# Patient Record
Sex: FEMALE | Race: WHITE | NOT HISPANIC OR LATINO | Employment: OTHER | URBAN - METROPOLITAN AREA
[De-identification: names, ages, dates, MRNs, and addresses within clinical notes are randomized per-mention and may not be internally consistent; named-entity substitution may affect disease eponyms.]

---

## 2023-11-08 PROBLEM — M79.7 FIBROMYALGIA: Status: ACTIVE | Noted: 2023-11-08

## 2023-11-08 PROBLEM — G89.29 CHRONIC PAIN OF BOTH KNEES: Status: ACTIVE | Noted: 2023-11-08

## 2023-11-08 PROBLEM — M25.561 CHRONIC PAIN OF BOTH KNEES: Status: ACTIVE | Noted: 2023-11-08

## 2023-11-08 PROBLEM — Q79.62 EHLERS-DANLOS, HYPERMOBILE TYPE (HHS-HCC): Status: ACTIVE | Noted: 2023-11-08

## 2023-11-08 PROBLEM — G96.198 PSEUDOMENINGOCELE: Status: ACTIVE | Noted: 2023-11-08

## 2023-11-08 PROBLEM — M25.562 CHRONIC PAIN OF BOTH KNEES: Status: ACTIVE | Noted: 2023-11-08

## 2023-11-08 PROBLEM — G56.00 CARPAL TUNNEL SYNDROME: Status: ACTIVE | Noted: 2023-11-08

## 2023-11-08 PROBLEM — Q07.00 ARNOLD-CHIARI MALFORMATION (MULTI): Status: ACTIVE | Noted: 2023-11-08

## 2023-11-08 PROBLEM — G90.A POTS (POSTURAL ORTHOSTATIC TACHYCARDIA SYNDROME): Status: ACTIVE | Noted: 2023-11-08

## 2023-11-08 PROBLEM — I35.1 AORTIC VALVE REGURGITATION, NONRHEUMATIC: Status: ACTIVE | Noted: 2023-11-08

## 2023-11-08 PROBLEM — E66.3 OVERWEIGHT (BMI 25.0-29.9): Status: ACTIVE | Noted: 2023-11-08

## 2023-11-08 PROBLEM — G24.3 CERVICAL DYSTONIA: Status: ACTIVE | Noted: 2023-11-08

## 2023-11-08 PROBLEM — G43.711 CHRONIC MIGRAINE WITHOUT AURA, WITH INTRACTABLE MIGRAINE, SO STATED, WITH STATUS MIGRAINOSUS: Status: ACTIVE | Noted: 2023-11-08

## 2023-11-08 PROBLEM — F17.200 CURRENT SMOKER: Status: ACTIVE | Noted: 2023-11-08

## 2023-11-08 RX ORDER — HYDROXYZINE HYDROCHLORIDE 25 MG/1
25 TABLET, FILM COATED ORAL NIGHTLY PRN
COMMUNITY
Start: 2021-09-28

## 2023-11-08 RX ORDER — POLYETHYLENE GLYCOL 3350 17 G/17G
17 POWDER, FOR SOLUTION ORAL
COMMUNITY

## 2023-11-08 RX ORDER — AMITRIPTYLINE HYDROCHLORIDE 25 MG/1
TABLET, FILM COATED ORAL
COMMUNITY

## 2023-11-08 RX ORDER — DOCUSATE SODIUM 100 MG/1
100 CAPSULE, LIQUID FILLED ORAL
COMMUNITY

## 2023-11-09 ENCOUNTER — HOSPITAL ENCOUNTER (OUTPATIENT)
Dept: NEUROLOGY | Facility: HOSPITAL | Age: 48
Discharge: HOME | End: 2023-11-09
Payer: COMMERCIAL

## 2023-11-09 DIAGNOSIS — G56.00 CARPAL TUNNEL SYNDROME, UNSPECIFIED UPPER LIMB: ICD-10-CM

## 2023-11-09 PROCEDURE — 95913 NRV CNDJ TEST 13/> STUDIES: CPT | Performed by: PSYCHIATRY & NEUROLOGY

## 2023-11-09 PROCEDURE — 95886 MUSC TEST DONE W/N TEST COMP: CPT | Performed by: PSYCHIATRY & NEUROLOGY

## 2023-11-09 PROCEDURE — 95886 MUSC TEST DONE W/N TEST COMP: CPT | Mod: GC | Performed by: PSYCHIATRY & NEUROLOGY

## 2023-11-09 PROCEDURE — 95913 NRV CNDJ TEST 13/> STUDIES: CPT | Mod: GC | Performed by: PSYCHIATRY & NEUROLOGY

## 2023-11-15 ENCOUNTER — OFFICE VISIT (OUTPATIENT)
Dept: ORTHOPEDIC SURGERY | Facility: CLINIC | Age: 48
End: 2023-11-15
Payer: COMMERCIAL

## 2023-11-15 ENCOUNTER — PREP FOR PROCEDURE (OUTPATIENT)
Dept: ORTHOPEDIC SURGERY | Facility: CLINIC | Age: 48
End: 2023-11-15

## 2023-11-15 VITALS — WEIGHT: 150 LBS | HEIGHT: 66 IN | BODY MASS INDEX: 24.11 KG/M2

## 2023-11-15 DIAGNOSIS — G56.01 CARPAL TUNNEL SYNDROME OF RIGHT WRIST: ICD-10-CM

## 2023-11-15 DIAGNOSIS — G56.02 CARPAL TUNNEL SYNDROME OF LEFT WRIST: Primary | ICD-10-CM

## 2023-11-15 PROCEDURE — 99204 OFFICE O/P NEW MOD 45 MIN: CPT | Performed by: ORTHOPAEDIC SURGERY

## 2023-11-15 NOTE — PROGRESS NOTES
New patient ref from Dr. Grady bilateral carpal tunnel - EMG done - right worse than left -no prior sx, patient does wear braces and has had injections in the past 2011  Patient has 2 types of marcial-Danlos

## 2023-11-15 NOTE — H&P (VIEW-ONLY)
48 y.o. female presents today for evaluation of bilateral hand numbness, tingling, weakness and pain. The patient reports symptoms for months, getting worse.  Pain is controlled.  Reports no previous surgeries or trauma to the area.  Reports pain worse with use, better at rest.  Pain numb and tingly, wakes them from sleep.   Worse driving a car and talking on a phone.  Splints have been worn without much relief.  Had shots a decade ago.  Got EMG.  Wants surgery.    Review of Systems    Constitutional: no fever, no chills, not feeling tired, no recent weight gain and no recent weight loss.   ENT: no nosebleeds.   Cardiovascular: no chest pain.   Respiratory: no shortness of breath and no cough.   Gastrointestinal: no abdominal pain, no nausea, no vomiting and no diarrhea.   Musculoskeletal: per HPI  Integumentary: no rashes and no skin wound.   Neurological: no headache.   Psychiatric: no depression and no sleep disturbances.   Endocrine: no muscle weakness and no muscle cramps.   Hematologic/Lymphatic: no swollen glands and no tendency for easy bruising.     All other systems have been reviewed and are negative for complaint    Patient's past medical history, past surgical history, allergies, and medications have been reviewed unless otherwise noted in the chart.     Carpal Tunnel Exam  Inspection:  no evidence of infection, no edema, no erythema, no ecchymosis, Palpation:  compartments are soft, no pain with palpation, Range of Motion:  full wrist and finger range of motion, Stability:  no wrist instability detected, Strength:  5/5 APB and intrinsics, Skin:  intact, Vascular:  capillary refill <2 seconds distally, Sensation:  decreased in the median nerve distribution, Test:  positive Tinel's at the Carpal Tunnel, positive Direct Carpal Tunnel Compression Test      Constitutional   General appearance: Alert and in no acute distress. Well developed, well nourished.    Eyes   External Eye, Conjunctiva and lids:  Normal external exam - pupils were equal in size, round, reactive to light (PERRL) with normal accommodation and extraocular movements intact (EOMI).   Ears, Nose, Mouth, and Throat   Hearing: Normal.   Neck   Neck: No neck mass was observed. Supple.   Pulmonary   Respiratory effort: No respiratory distress.   Auscultation of lungs: Clear bilateral breath sounds.   Cardiovascular   Examination of extremities: No peripheral edema.   Auscultation of heart: Heart rate and rhythm were normal, normal S1 and S2, no gallops, no murmurs and no pericardial rub.   Abdomen   Abdomen: Normal bowel sounds, soft nontender; no abdominal mass palpated.. No rebound, rigidity or guarding.    Skin   Skin and subcutaneous tissue: Normal skin color and pigmentation, normal skin turgor, and no rash.   Neurologic   Sensation: Normal.   Psychiatric   Judgment and insight: Intact.   Orientation to person, place, and time: Alert and oriented x 3.       Mood and affect: Normal.      EMG _ mild to mod right and mild left CTS    Bilateral CTS  Surgery discussion I discussed the diagnosis and treatment options with the patient today along with their associated risks and benefits. After thorough discussion, the patient has elected to proceed with surgical intervention. The patient understands the risks of bleeding, infection, loss of life or limb, pain, loss of motion, failure of the goals of surgery or the potential need for additional surgery. The patient would like to accept these risks and proceed. All questions were answered to the patients satisfaction and the patient seems satisfied with the plan.    Plan right ECTR  FU 2 weeks after - No XR

## 2023-11-30 ENCOUNTER — ANESTHESIA EVENT (OUTPATIENT)
Dept: OPERATING ROOM | Facility: HOSPITAL | Age: 48
End: 2023-11-30
Payer: COMMERCIAL

## 2023-12-08 ENCOUNTER — HOSPITAL ENCOUNTER (OUTPATIENT)
Facility: HOSPITAL | Age: 48
Setting detail: OUTPATIENT SURGERY
Discharge: HOME | End: 2023-12-08
Attending: ORTHOPAEDIC SURGERY | Admitting: ORTHOPAEDIC SURGERY
Payer: COMMERCIAL

## 2023-12-08 ENCOUNTER — ANESTHESIA (OUTPATIENT)
Dept: OPERATING ROOM | Facility: HOSPITAL | Age: 48
End: 2023-12-08
Payer: COMMERCIAL

## 2023-12-08 VITALS
HEART RATE: 59 BPM | WEIGHT: 150 LBS | TEMPERATURE: 97.4 F | HEIGHT: 67 IN | DIASTOLIC BLOOD PRESSURE: 77 MMHG | OXYGEN SATURATION: 100 % | SYSTOLIC BLOOD PRESSURE: 122 MMHG | RESPIRATION RATE: 17 BRPM | BODY MASS INDEX: 23.54 KG/M2

## 2023-12-08 DIAGNOSIS — G56.01 CARPAL TUNNEL SYNDROME OF RIGHT WRIST: Primary | ICD-10-CM

## 2023-12-08 LAB — PREGNANCY TEST URINE, POC: NEGATIVE

## 2023-12-08 PROCEDURE — 7100000010 HC PHASE TWO TIME - EACH INCREMENTAL 1 MINUTE: Performed by: ORTHOPAEDIC SURGERY

## 2023-12-08 PROCEDURE — 7100000009 HC PHASE TWO TIME - INITIAL BASE CHARGE: Performed by: ORTHOPAEDIC SURGERY

## 2023-12-08 PROCEDURE — 3700000002 HC GENERAL ANESTHESIA TIME - EACH INCREMENTAL 1 MINUTE: Performed by: ORTHOPAEDIC SURGERY

## 2023-12-08 PROCEDURE — 2720000007 HC OR 272 NO HCPCS: Performed by: ORTHOPAEDIC SURGERY

## 2023-12-08 PROCEDURE — 2500000005 HC RX 250 GENERAL PHARMACY W/O HCPCS: Performed by: ORTHOPAEDIC SURGERY

## 2023-12-08 PROCEDURE — 3600000008 HC OR TIME - EACH INCREMENTAL 1 MINUTE - PROCEDURE LEVEL THREE: Performed by: ORTHOPAEDIC SURGERY

## 2023-12-08 PROCEDURE — 3600000003 HC OR TIME - INITIAL BASE CHARGE - PROCEDURE LEVEL THREE: Performed by: ORTHOPAEDIC SURGERY

## 2023-12-08 PROCEDURE — 2500000004 HC RX 250 GENERAL PHARMACY W/ HCPCS (ALT 636 FOR OP/ED): Performed by: LICENSED PRACTICAL NURSE

## 2023-12-08 PROCEDURE — 81025 URINE PREGNANCY TEST: CPT | Performed by: ANESTHESIOLOGY

## 2023-12-08 PROCEDURE — 2500000004 HC RX 250 GENERAL PHARMACY W/ HCPCS (ALT 636 FOR OP/ED): Performed by: ANESTHESIOLOGY

## 2023-12-08 PROCEDURE — 3700000001 HC GENERAL ANESTHESIA TIME - INITIAL BASE CHARGE: Performed by: ORTHOPAEDIC SURGERY

## 2023-12-08 PROCEDURE — 2500000001 HC RX 250 WO HCPCS SELF ADMINISTERED DRUGS (ALT 637 FOR MEDICARE OP): Performed by: ANESTHESIOLOGY

## 2023-12-08 PROCEDURE — 7100000001 HC RECOVERY ROOM TIME - INITIAL BASE CHARGE: Performed by: ORTHOPAEDIC SURGERY

## 2023-12-08 PROCEDURE — 29848 WRIST ENDOSCOPY/SURGERY: CPT | Performed by: ORTHOPAEDIC SURGERY

## 2023-12-08 PROCEDURE — 7100000002 HC RECOVERY ROOM TIME - EACH INCREMENTAL 1 MINUTE: Performed by: ORTHOPAEDIC SURGERY

## 2023-12-08 RX ORDER — LIDOCAINE HYDROCHLORIDE 10 MG/ML
0.1 INJECTION, SOLUTION EPIDURAL; INFILTRATION; INTRACAUDAL; PERINEURAL ONCE
Status: DISCONTINUED | OUTPATIENT
Start: 2023-12-08 | End: 2023-12-08 | Stop reason: HOSPADM

## 2023-12-08 RX ORDER — BUPIVACAINE HCL/EPINEPHRINE 0.5-1:200K
VIAL (ML) INJECTION AS NEEDED
Status: DISCONTINUED | OUTPATIENT
Start: 2023-12-08 | End: 2023-12-08 | Stop reason: HOSPADM

## 2023-12-08 RX ORDER — PROPOFOL 10 MG/ML
INJECTION, EMULSION INTRAVENOUS AS NEEDED
Status: DISCONTINUED | OUTPATIENT
Start: 2023-12-08 | End: 2023-12-08

## 2023-12-08 RX ORDER — LIDOCAINE HYDROCHLORIDE AND EPINEPHRINE 20; 10 MG/ML; UG/ML
INJECTION, SOLUTION INFILTRATION; PERINEURAL AS NEEDED
Status: DISCONTINUED | OUTPATIENT
Start: 2023-12-08 | End: 2023-12-08 | Stop reason: HOSPADM

## 2023-12-08 RX ORDER — ONDANSETRON 4 MG/1
4 TABLET, FILM COATED ORAL EVERY 8 HOURS PRN
Qty: 10 TABLET | Refills: 0 | Status: SHIPPED | OUTPATIENT
Start: 2023-12-08

## 2023-12-08 RX ORDER — HYDROCODONE BITARTRATE AND ACETAMINOPHEN 5; 325 MG/1; MG/1
1 TABLET ORAL EVERY 6 HOURS PRN
Qty: 10 TABLET | Refills: 0 | Status: SHIPPED | OUTPATIENT
Start: 2023-12-08 | End: 2023-12-26 | Stop reason: HOSPADM

## 2023-12-08 RX ORDER — IBUPROFEN 400 MG/1
800 TABLET ORAL ONCE
Status: COMPLETED | OUTPATIENT
Start: 2023-12-08 | End: 2023-12-08

## 2023-12-08 RX ORDER — ONDANSETRON HYDROCHLORIDE 2 MG/ML
4 INJECTION, SOLUTION INTRAVENOUS ONCE AS NEEDED
Status: DISCONTINUED | OUTPATIENT
Start: 2023-12-08 | End: 2023-12-08 | Stop reason: HOSPADM

## 2023-12-08 RX ORDER — CEFAZOLIN SODIUM 2 G/100ML
INJECTION, SOLUTION INTRAVENOUS AS NEEDED
Status: DISCONTINUED | OUTPATIENT
Start: 2023-12-08 | End: 2023-12-08

## 2023-12-08 RX ORDER — FENTANYL CITRATE 50 UG/ML
INJECTION, SOLUTION INTRAMUSCULAR; INTRAVENOUS CONTINUOUS PRN
Status: DISCONTINUED | OUTPATIENT
Start: 2023-12-08 | End: 2023-12-08

## 2023-12-08 RX ORDER — FAMOTIDINE 10 MG/ML
20 INJECTION INTRAVENOUS ONCE
Status: COMPLETED | OUTPATIENT
Start: 2023-12-08 | End: 2023-12-08

## 2023-12-08 RX ORDER — SODIUM CHLORIDE, SODIUM LACTATE, POTASSIUM CHLORIDE, CALCIUM CHLORIDE 600; 310; 30; 20 MG/100ML; MG/100ML; MG/100ML; MG/100ML
100 INJECTION, SOLUTION INTRAVENOUS CONTINUOUS
Status: DISCONTINUED | OUTPATIENT
Start: 2023-12-08 | End: 2023-12-08 | Stop reason: HOSPADM

## 2023-12-08 RX ADMIN — IBUPROFEN 800 MG: 400 TABLET, FILM COATED ORAL at 10:06

## 2023-12-08 RX ADMIN — PROPOFOL 50 MG: 10 INJECTION, EMULSION INTRAVENOUS at 11:10

## 2023-12-08 RX ADMIN — FAMOTIDINE 20 MG: 10 INJECTION, SOLUTION INTRAVENOUS at 09:02

## 2023-12-08 RX ADMIN — CEFAZOLIN SODIUM 2 G: 2 INJECTION, SOLUTION INTRAVENOUS at 11:00

## 2023-12-08 RX ADMIN — PROPOFOL 100 MG: 10 INJECTION, EMULSION INTRAVENOUS at 11:05

## 2023-12-08 RX ADMIN — SODIUM CHLORIDE, POTASSIUM CHLORIDE, SODIUM LACTATE AND CALCIUM CHLORIDE 100 ML/HR: 600; 310; 30; 20 INJECTION, SOLUTION INTRAVENOUS at 09:02

## 2023-12-08 RX ADMIN — PROPOFOL 50 MG: 10 INJECTION, EMULSION INTRAVENOUS at 11:07

## 2023-12-08 RX ADMIN — PROPOFOL 50 MG: 10 INJECTION, EMULSION INTRAVENOUS at 11:12

## 2023-12-08 SDOH — HEALTH STABILITY: MENTAL HEALTH: CURRENT SMOKER: 1

## 2023-12-08 ASSESSMENT — PAIN SCALES - GENERAL
PAINLEVEL_OUTOF10: 0 - NO PAIN
PAINLEVEL_OUTOF10: 4
PAINLEVEL_OUTOF10: 0 - NO PAIN
PAIN_LEVEL: 2

## 2023-12-08 ASSESSMENT — PAIN - FUNCTIONAL ASSESSMENT
PAIN_FUNCTIONAL_ASSESSMENT: 0-10

## 2023-12-08 ASSESSMENT — COLUMBIA-SUICIDE SEVERITY RATING SCALE - C-SSRS
2. HAVE YOU ACTUALLY HAD ANY THOUGHTS OF KILLING YOURSELF?: NO
6. HAVE YOU EVER DONE ANYTHING, STARTED TO DO ANYTHING, OR PREPARED TO DO ANYTHING TO END YOUR LIFE?: NO
1. IN THE PAST MONTH, HAVE YOU WISHED YOU WERE DEAD OR WISHED YOU COULD GO TO SLEEP AND NOT WAKE UP?: NO

## 2023-12-08 ASSESSMENT — PAIN DESCRIPTION - LOCATION: LOCATION: HEAD

## 2023-12-08 ASSESSMENT — PAIN DESCRIPTION - ORIENTATION: ORIENTATION: OTHER (COMMENT)

## 2023-12-08 NOTE — OP NOTE
ORTHOPEDIC OPERATIVE NOTE    Name: Mirtha Kumar  : 1975  Surgeon: Rogelio Harley DO  Facility: [facility]  Date of Surgery: 23  ORTHOPEDIC OPERATIVE NOTE    SURGEON:     Rogelio Harley DO  PRE OP DIAGNOSIS:  Carpal Tunnel Syndrome right Wrist  POST OP DIAGNOSIS:  Same  PROCEDURE:   Endoscopic Carpal Tunnel Release right Wrist    ANESTHESIA:  Local with sedation  ASSISTANT:    SA Gao  COMPLICATIONS:   None.  CONDITION:    Satisfactory to PACU.  BLOOD LOSS: Minimal    INDICATION FOR PROCEDURE: The patient is a 48 y.o. -year-old female who has failed nonsurgical management for right carpal tunnel syndrome. The patient was seen in the office, fully informed risks and benefits of the procedure, and elected to undergo the procedure.    PROCEDURE IN DETAIL: The patient was seen and consented preoperatively with side and site of surgery appropriately marked. The patient was taken back to the operative suite, placed supine on the operative table, and placed on monitor for the duration of case. The patient was administered sedation and monitored throughout the entire surgery by Department of Anesthesia. While sedated, the patient was administered 5 cc of mixed marcaine and lidocaine to the volar aspect of wrist and palm for local anesthesia. The operative upper extremity was then sterilely prepped and draped in sterile orthopedic fashion, elevated with an Esmarch, and tourniquet inflated to 250 mmHg for the duration of case, and time-out was performed confirming site of surgery and surgery to be performed.    A 1-cm transverse incision was made to the ulnar aspect of the palmaris longus at proximal wrist crease. Skin and underlying tissues were sharply dissected down. Hemostasis maintained with bipolar electrocauterization. Distally based flap of the fascia overlying the median nerve was then released, and under direct visualization, proximal fascia was released with Littler scissors. The elevator and  dilator were then placed in the carpal tunnel with appropriate ease of passage and corrugated feel of the undersurface of transcarpal ligament. The endoscope was then placed under direct visualization. The transverse carpal ligament was released in its entirety, confirmed both visually as well as by utilizing endoscope as a probe, which freely passed following release. The nerve was evaluated. No evidence of lesion or adhesion was present.    The wound was irrigated with sterile saline, closed with 5-0 nylon suture. Sterile dressing was then placed of Xeroform and 4x4s affixed with Kerlix and Ace wrap. Tourniquet was deflated, and the patient was taken to PACU in satisfactory condition.    Electronically signed  Rogelio Harley DO

## 2023-12-08 NOTE — ANESTHESIA POSTPROCEDURE EVALUATION
Patient: Mirtha Kumar    Procedure Summary       Date: 12/08/23 Room / Location: [facility] OR 07 / Virtual [facility] OR    Anesthesia Start: 1103 Anesthesia Stop: 1123    Procedure: RIGHT ENDOSCOPIC CARPAL TUNNEL RELEASE , LOCAL/MAC (Right: Wrist) Diagnosis:       Carpal tunnel syndrome of right wrist      (Carpal tunnel syndrome of right wrist [G56.01])    Surgeons: Alessio Harley DO Responsible Provider: EILEEN Melchor    Anesthesia Type: MAC ASA Status: 2            Anesthesia Type: MAC    Vitals Value Taken Time   /81 12/08/23 1201   Temp 97'8 12/08/23 1431   Pulse 58 12/08/23 1205   Resp 31 12/08/23 1205   SpO2 99 % 12/08/23 1205   Vitals shown include unvalidated device data.    Anesthesia Post Evaluation    Patient location during evaluation: PACU  Patient participation: complete - patient participated  Level of consciousness: awake  Pain score: 2  Pain management: adequate  Airway patency: patent  Cardiovascular status: acceptable  Respiratory status: acceptable  Hydration status: acceptable  Postoperative Nausea and Vomiting: none        There were no known notable events for this encounter.

## 2023-12-08 NOTE — ANESTHESIA PREPROCEDURE EVALUATION
Patient: Mirtha Kumar    Procedure Information       Date/Time: 12/08/23 1000    Procedure: RIGHT ENDOSCOPIC CARPAL TUNNEL RELEASE , LOCAL/MAC (Right: Wrist) - 15 MINUTES, LOCAL/MAC, 2 GRAM ANCEF, NO SPECIAL EQUIPEMENT    Location: [facility] OR 07 / Virtual [facility] OR    Surgeons: Alessio Harley, DO            Relevant Problems   Cardiovascular   (+) Aortic valve regurgitation, nonrheumatic      Neuro/Psych   (+) Carpal tunnel syndrome      Musculoskeletal   (+) Carpal tunnel syndrome   (+) Fibromyalgia       Clinical information reviewed:   Tobacco  Allergies  Meds   Med Hx  Surg Hx  OB Status  Fam Hx  Soc   Hx        NPO Detail:  NPO/Void Status  Carbonhydrate Drink Given Prior to Surgery? : N  Date of Last Liquid: 12/07/23  Time of Last Liquid: 2230  Date of Last Solid: 12/07/23  Time of Last Solid: 2230  Last Intake Type: Clear fluids         Physical Exam    Airway  Mallampati: II  TM distance: >3 FB     Cardiovascular - normal exam     Dental - normal exam     Pulmonary - normal exam     Abdominal - normal exam             Anesthesia Plan    ASA 2     MAC     The patient is a current smoker.  Patient was previously instructed to abstain from smoking on day of procedure.  Patient did not smoke on day of procedure.    intravenous induction   Anesthetic plan and risks discussed with patient.  Use of blood products discussed with patient who.    Plan discussed with CRNA.

## 2023-12-08 NOTE — DISCHARGE INSTRUCTIONS
[facility]  [phone]   Fax: [phone]    [address]    [address] [facility]  [address] [place] [address] [id number] [place] [id number]    Upper Extremity   Pre and Post-Operative Instructions     The information and instructions provided on these pages outline the standard pre-operative and post-operative procedures for patients having Upper extremity surgeries. Please take the time to read this material which is designed to smooth your pre-operative visit, your stay in the hospital and minimize the chance of post-operative complications. If you have further questions or would like further information, please call our office at [phone].     Pre-Operative (Before Surgery)     1. Discontinue taking aspirin, aspirin products or anti-inflammatory medications one week before your surgery. Patients on Coumadin, Persantine, Trental, Plavix, or other “blood thinning” medications should discuss discontinuation of this medication with your surgeon and medical doctor prior to surgery.     2. The Pre-Admission Testing Center (PATC) at [facility] will contact you to discuss any pre-operative testing. If you qualify, they will complete the screening process over the phone or schedule an appointment to complete their required pre-operative testing. Please complete the “Tell US About Yourself” form and bring with you to your [facility] appointment or to the hospital if you qualify for the phone screening. The Physician or the Anesthesia team may need any or all of the following prior to surgery:  Pre-Operative Lab Tests       EKG  Physical Exam   Consent form   Physical or occupation therapy directives     3. Wear comfortable, loose fitting clothing the day of surgery. Remove all jewelry and all nail polish. Please leave all valuables at home.    4. You will need to contact the Surgery department the day prior to surgery between 2-4pm to verify and confirm your arrival  time and any final instructions. For Monday surgeries you will needs to call on Friday between 2-4pm.  [phone]    5. DO NOT EAT OR DRINK ANYTHING AFTER MIDNIGHT THE NIGHT BEFORE SURGERY.     6. You need a  to bring you home from surgery. (only one person)     7. The surgery department or doctor's office will call you to set up COVID-19 testing         Post-Operatively (After Surgery)    1. Post-Operative Course    Following surgery, your surgeon will see your family in the family call room. Once stable, and in the Post Anesthesia Care Unit (PACU), you will be transported to your hospital room or allowed to go home if an outpatient procedure.     2. Dressing    Most patients will have a soft bandage over the operative site.  This bandage can be removed in 48 hours and replaced in with Band-Aids.  If this is a splint (hard casting material), then do NOT remove the dressing until follow up. DO NOT GET DRESSING WET! Once at home, if your dressing, splint, or cast feels too tight or mistakenly gets wet, please contact the office. Further dressing instructions may be given at surgery.     If your sutures are visible (black strings), they will be removed about 10-14 days after surgery.  If you do not see any sutures, then they are absorbable and will not need to be removed.  In either case, you should have an appointment to see your physician 10-14 after surgery.    3. Activity     Most people underestimate the length of time required to fully recover from surgery. If you had a block (where your arm feels numb), the sensation typically returns around 18-24 hours later.  It is recommended you take some pain medication the evening following your surgery so when the block wears off (in the middle of the night), you are not in severe pain.   With pain medication, start at the lowest dose that controls your pain.       After the first 1-3 days, we encourage you to balance your activity between ambulation, sitting in  a chair,   and resting in bed with your arm elevated. Let swelling and pain be your guide to activity, you should   avoid prolonged (over 20 minutes) standing or sitting. In general, limit your activities to home for the first   1-3 days.    4. Pain    You will be discharged to home with a prescription for oral pain medication. A most important factor in pain control is rest and elevation. Ice may also be applied to the operative site for 30 minute intervals. Please use a waterproof bag for ice or cold packs.  Again, as you feel the numbness resolving and some onset of discomfort, please take pain medication, don't wait till full resolution of block.   Try to use the lowest dose of pain medication that controls your pain.      The pain medication may cause constipation. Drink plenty of fluids, eat fruits and vegetables. You may use an over the counter laxative or stool softener if necessary. Take pain medication with some food in your stomach, as prescribed by your pharmacist.     5. Elevation   Elevation of the operative extremity is critical. Elevation reduces swelling and minimizes pain. Less swelling is associated with a lower infectious rate, fewer wound complications, less post-operative stiffness, and more rapid recovery of function. To keep the swelling down, your hand must be kept above the level of your heart.

## 2023-12-08 NOTE — SIGNIFICANT EVENT
Dr Marie, head anesthesiologist notified of history of daily dizziness. Pt. verbalizes has had 2 brain surgeries that has affected her equillibrium. History of chiari malformation and aortic valve regurg. Per Dr marie no new orders at this time and no need for EKG d/t short duration of surgery. Dr Harley over and marked site and answered pt questions.

## 2023-12-11 NOTE — SIGNIFICANT EVENT
Pt states she already has followup appt scheduled and is doing very well and was pleased with her care.

## 2023-12-20 ENCOUNTER — APPOINTMENT (OUTPATIENT)
Dept: ORTHOPEDIC SURGERY | Facility: CLINIC | Age: 48
End: 2023-12-20
Payer: COMMERCIAL

## 2023-12-21 ENCOUNTER — OFFICE VISIT (OUTPATIENT)
Dept: ORTHOPEDIC SURGERY | Facility: CLINIC | Age: 48
End: 2023-12-21
Payer: COMMERCIAL

## 2023-12-21 ENCOUNTER — PREP FOR PROCEDURE (OUTPATIENT)
Dept: ORTHOPEDIC SURGERY | Facility: CLINIC | Age: 48
End: 2023-12-21

## 2023-12-21 VITALS — HEIGHT: 67 IN | BODY MASS INDEX: 23.54 KG/M2 | WEIGHT: 150 LBS

## 2023-12-21 DIAGNOSIS — G56.01 CARPAL TUNNEL SYNDROME OF RIGHT WRIST: Primary | ICD-10-CM

## 2023-12-21 DIAGNOSIS — G56.02 CARPAL TUNNEL SYNDROME ON LEFT: ICD-10-CM

## 2023-12-21 PROCEDURE — 99214 OFFICE O/P EST MOD 30 MIN: CPT | Performed by: ORTHOPAEDIC SURGERY

## 2023-12-21 NOTE — H&P (VIEW-ONLY)
48 y.o. female presents today for for follow up after right ECTR. The patient reports doing well, no issues. The DOS was 2 weeks ago. Pain is controlled. Patient denies numbness and tingling.  Night pain gone.     48 y.o. female presents today for follow up of left hand numbness, tingling, weakness and pain. The patient reports symptoms for months, getting worse.  Pain is controlled.  Reports no previous surgeries or trauma to the area.  Reports pain worse with use, better at rest.  Pain numb and tingly, wakes them from sleep.   Worse driving a car and talking on a phone.  Splints have been worn without much relief.  Had shots a decade ago.  Got EMG.  Wants surgery.    Review of Systems    Constitutional: no fever, no chills, not feeling tired, no recent weight gain and no recent weight loss.   ENT: no nosebleeds.   Cardiovascular: no chest pain.   Respiratory: no shortness of breath and no cough.   Gastrointestinal: no abdominal pain, no nausea, no vomiting and no diarrhea.   Musculoskeletal: per HPI  Integumentary: no rashes and no skin wound.   Neurological: no headache.   Psychiatric: no depression and no sleep disturbances.   Endocrine: no muscle weakness and no muscle cramps.   Hematologic/Lymphatic: no swollen glands and no tendency for easy bruising.     All other systems have been reviewed and are negative for complaint    Patient's past medical history, past surgical history, allergies, and medications have been reviewed unless otherwise noted in the chart.     Post-Op Exam  Inspection:  no evidence of infection, no erythema, Palpation:  compartments are soft, Range of Motion:  full Stability:  stable Strength:  intact 5/5 Skin:  incision site clean, dry and intact, healing without complication, Vascular:  capillary refill <2 seconds distally, Sensation:  intact to light touch distally.    Carpal Tunnel Exam  Inspection:  no evidence of infection, no edema, no erythema, no ecchymosis, Palpation:   compartments are soft, no pain with palpation, Range of Motion:  full wrist and finger range of motion, Stability:  no wrist instability detected, Strength:  5/5 APB and intrinsics, Skin:  intact, Vascular:  capillary refill <2 seconds distally, Sensation:  decreased in the median nerve distribution, Test:  positive Tinel's at the Carpal Tunnel, positive Direct Carpal Tunnel Compression Test      Constitutional   General appearance: Alert and in no acute distress. Well developed, well nourished.    Eyes   External Eye, Conjunctiva and lids: Normal external exam - pupils were equal in size, round, reactive to light (PERRL) with normal accommodation and extraocular movements intact (EOMI).   Ears, Nose, Mouth, and Throat   Hearing: Normal.   Neck   Neck: No neck mass was observed. Supple.   Pulmonary   Respiratory effort: No respiratory distress.   Auscultation of lungs: Clear bilateral breath sounds.   Cardiovascular   Examination of extremities: No peripheral edema.   Auscultation of heart: Heart rate and rhythm were normal, normal S1 and S2, no gallops, no murmurs and no pericardial rub.   Abdomen   Abdomen: Normal bowel sounds, soft nontender; no abdominal mass palpated.. No rebound, rigidity or guarding.    Skin   Skin and subcutaneous tissue: Normal skin color and pigmentation, normal skin turgor, and no rash.   Neurologic   Sensation: Normal.   Psychiatric   Judgment and insight: Intact.   Orientation to person, place, and time: Alert and oriented x 3.       Mood and affect: Normal.      EMG - mild to mod right and mild left CTS    2 weeks s/p right ECTR  I discussed the diagnosis and treatment options with the patient today along with their associated risks and benefits. After thorough discussion, the patient has elected to proceed with conservative management. All questions were answered to the patients satisfaction who seems satisfied with the plan.      Sutures removed  Steris  Vitamin E cream prn to scar  tissue  Activities as tolerated  FU 4-6 weeks prn - no XR     Left CTS  Surgery discussion I discussed the diagnosis and treatment options with the patient today along with their associated risks and benefits. After thorough discussion, the patient has elected to proceed with surgical intervention. The patient understands the risks of bleeding, infection, loss of life or limb, pain, loss of motion, failure of the goals of surgery or the potential need for additional surgery. The patient would like to accept these risks and proceed. All questions were answered to the patients satisfaction and the patient seems satisfied with the plan.    Plan leftt ECTR  FU 2 weeks after - No XR

## 2023-12-25 ENCOUNTER — ANESTHESIA EVENT (OUTPATIENT)
Dept: OPERATING ROOM | Facility: HOSPITAL | Age: 48
End: 2023-12-25
Payer: COMMERCIAL

## 2023-12-25 RX ORDER — ONDANSETRON HYDROCHLORIDE 2 MG/ML
4 INJECTION, SOLUTION INTRAVENOUS ONCE AS NEEDED
Status: CANCELLED | OUTPATIENT
Start: 2023-12-25

## 2023-12-26 ENCOUNTER — PHARMACY VISIT (OUTPATIENT)
Dept: PHARMACY | Facility: CLINIC | Age: 48
End: 2023-12-26
Payer: MEDICARE

## 2023-12-26 ENCOUNTER — ANESTHESIA (OUTPATIENT)
Dept: OPERATING ROOM | Facility: HOSPITAL | Age: 48
End: 2023-12-26
Payer: COMMERCIAL

## 2023-12-26 ENCOUNTER — HOSPITAL ENCOUNTER (OUTPATIENT)
Facility: HOSPITAL | Age: 48
Setting detail: OUTPATIENT SURGERY
Discharge: HOME | End: 2023-12-26
Attending: ORTHOPAEDIC SURGERY | Admitting: ORTHOPAEDIC SURGERY
Payer: COMMERCIAL

## 2023-12-26 ENCOUNTER — HOSPITAL ENCOUNTER (OUTPATIENT)
Dept: CARDIOLOGY | Facility: HOSPITAL | Age: 48
Discharge: HOME | End: 2023-12-26
Payer: COMMERCIAL

## 2023-12-26 VITALS
RESPIRATION RATE: 16 BRPM | HEART RATE: 76 BPM | OXYGEN SATURATION: 98 % | TEMPERATURE: 98.1 F | DIASTOLIC BLOOD PRESSURE: 75 MMHG | SYSTOLIC BLOOD PRESSURE: 104 MMHG

## 2023-12-26 DIAGNOSIS — G56.02 CARPAL TUNNEL SYNDROME ON LEFT: Primary | ICD-10-CM

## 2023-12-26 PROBLEM — Z98.890 PONV (POSTOPERATIVE NAUSEA AND VOMITING): Status: ACTIVE | Noted: 2023-12-26

## 2023-12-26 PROBLEM — R11.2 PONV (POSTOPERATIVE NAUSEA AND VOMITING): Status: ACTIVE | Noted: 2023-12-26

## 2023-12-26 LAB
ANION GAP SERPL CALC-SCNC: 10 MMOL/L (ref 10–20)
ATRIAL RATE: 73 BPM
BUN SERPL-MCNC: 9 MG/DL (ref 6–23)
CALCIUM SERPL-MCNC: 9 MG/DL (ref 8.6–10.3)
CHLORIDE SERPL-SCNC: 107 MMOL/L (ref 98–107)
CO2 SERPL-SCNC: 24 MMOL/L (ref 21–32)
CREAT SERPL-MCNC: 0.71 MG/DL (ref 0.5–1.05)
ERYTHROCYTE [DISTWIDTH] IN BLOOD BY AUTOMATED COUNT: 13.8 % (ref 11.5–14.5)
GFR SERPL CREATININE-BSD FRML MDRD: >90 ML/MIN/1.73M*2
GLUCOSE SERPL-MCNC: 88 MG/DL (ref 74–99)
HCT VFR BLD AUTO: 41.6 % (ref 36–46)
HGB BLD-MCNC: 14.1 G/DL (ref 12–16)
MCH RBC QN AUTO: 31.8 PG (ref 26–34)
MCHC RBC AUTO-ENTMCNC: 33.9 G/DL (ref 32–36)
MCV RBC AUTO: 94 FL (ref 80–100)
NRBC BLD-RTO: 0 /100 WBCS (ref 0–0)
P AXIS: 30 DEGREES
PLATELET # BLD AUTO: 283 X10*3/UL (ref 150–450)
POTASSIUM SERPL-SCNC: 4.4 MMOL/L (ref 3.5–5.3)
PR INTERVAL: 159 MS
PREGNANCY TEST URINE, POC: NEGATIVE
Q ONSET: 251 MS
QRS COUNT: 12 BEATS
QRS DURATION: 90 MS
QT INTERVAL: 394 MS
QTC CALCULATION(BAZETT): 435 MS
QTC FREDERICIA: 420 MS
R AXIS: 67 DEGREES
RBC # BLD AUTO: 4.44 X10*6/UL (ref 4–5.2)
SODIUM SERPL-SCNC: 137 MMOL/L (ref 136–145)
T AXIS: 52 DEGREES
T OFFSET: 448 MS
VENTRICULAR RATE: 73 BPM
WBC # BLD AUTO: 8.3 X10*3/UL (ref 4.4–11.3)

## 2023-12-26 PROCEDURE — 2500000004 HC RX 250 GENERAL PHARMACY W/ HCPCS (ALT 636 FOR OP/ED): Performed by: ANESTHESIOLOGY

## 2023-12-26 PROCEDURE — 93010 ELECTROCARDIOGRAM REPORT: CPT | Performed by: INTERNAL MEDICINE

## 2023-12-26 PROCEDURE — 29848 WRIST ENDOSCOPY/SURGERY: CPT | Performed by: ORTHOPAEDIC SURGERY

## 2023-12-26 PROCEDURE — 93005 ELECTROCARDIOGRAM TRACING: CPT

## 2023-12-26 PROCEDURE — 2500000004 HC RX 250 GENERAL PHARMACY W/ HCPCS (ALT 636 FOR OP/ED): Performed by: NURSE ANESTHETIST, CERTIFIED REGISTERED

## 2023-12-26 PROCEDURE — 85027 COMPLETE CBC AUTOMATED: CPT | Performed by: ANESTHESIOLOGY

## 2023-12-26 PROCEDURE — 7100000009 HC PHASE TWO TIME - INITIAL BASE CHARGE: Performed by: ORTHOPAEDIC SURGERY

## 2023-12-26 PROCEDURE — RXMED WILLOW AMBULATORY MEDICATION CHARGE

## 2023-12-26 PROCEDURE — 2500000004 HC RX 250 GENERAL PHARMACY W/ HCPCS (ALT 636 FOR OP/ED): Performed by: ORTHOPAEDIC SURGERY

## 2023-12-26 PROCEDURE — 80048 BASIC METABOLIC PNL TOTAL CA: CPT | Performed by: ANESTHESIOLOGY

## 2023-12-26 PROCEDURE — 3700000001 HC GENERAL ANESTHESIA TIME - INITIAL BASE CHARGE: Performed by: ORTHOPAEDIC SURGERY

## 2023-12-26 PROCEDURE — 2500000005 HC RX 250 GENERAL PHARMACY W/O HCPCS: Performed by: ORTHOPAEDIC SURGERY

## 2023-12-26 PROCEDURE — 3600000003 HC OR TIME - INITIAL BASE CHARGE - PROCEDURE LEVEL THREE: Performed by: ORTHOPAEDIC SURGERY

## 2023-12-26 PROCEDURE — 3600000008 HC OR TIME - EACH INCREMENTAL 1 MINUTE - PROCEDURE LEVEL THREE: Performed by: ORTHOPAEDIC SURGERY

## 2023-12-26 PROCEDURE — 36415 COLL VENOUS BLD VENIPUNCTURE: CPT | Performed by: ANESTHESIOLOGY

## 2023-12-26 PROCEDURE — 7100000010 HC PHASE TWO TIME - EACH INCREMENTAL 1 MINUTE: Performed by: ORTHOPAEDIC SURGERY

## 2023-12-26 PROCEDURE — 2720000007 HC OR 272 NO HCPCS: Performed by: ORTHOPAEDIC SURGERY

## 2023-12-26 PROCEDURE — 2500000001 HC RX 250 WO HCPCS SELF ADMINISTERED DRUGS (ALT 637 FOR MEDICARE OP): Performed by: ANESTHESIOLOGY

## 2023-12-26 PROCEDURE — 3700000002 HC GENERAL ANESTHESIA TIME - EACH INCREMENTAL 1 MINUTE: Performed by: ORTHOPAEDIC SURGERY

## 2023-12-26 PROCEDURE — 2500000005 HC RX 250 GENERAL PHARMACY W/O HCPCS: Performed by: NURSE ANESTHETIST, CERTIFIED REGISTERED

## 2023-12-26 RX ORDER — FAMOTIDINE 10 MG/ML
20 INJECTION INTRAVENOUS ONCE
Status: COMPLETED | OUTPATIENT
Start: 2023-12-26 | End: 2023-12-26

## 2023-12-26 RX ORDER — DEXAMETHASONE SODIUM PHOSPHATE 4 MG/ML
8 INJECTION, SOLUTION INTRA-ARTICULAR; INTRALESIONAL; INTRAMUSCULAR; INTRAVENOUS; SOFT TISSUE ONCE
Status: COMPLETED | OUTPATIENT
Start: 2023-12-26 | End: 2023-12-26

## 2023-12-26 RX ORDER — METOCLOPRAMIDE HYDROCHLORIDE 5 MG/ML
10 INJECTION INTRAMUSCULAR; INTRAVENOUS ONCE
Status: COMPLETED | OUTPATIENT
Start: 2023-12-26 | End: 2023-12-26

## 2023-12-26 RX ORDER — SODIUM CITRATE AND CITRIC ACID MONOHYDRATE 334; 500 MG/5ML; MG/5ML
30 SOLUTION ORAL ONCE
Status: COMPLETED | OUTPATIENT
Start: 2023-12-26 | End: 2023-12-26

## 2023-12-26 RX ORDER — ONDANSETRON HYDROCHLORIDE 2 MG/ML
4 INJECTION, SOLUTION INTRAVENOUS ONCE
Status: COMPLETED | OUTPATIENT
Start: 2023-12-26 | End: 2023-12-26

## 2023-12-26 RX ORDER — BUPIVACAINE HCL/EPINEPHRINE 0.5-1:200K
VIAL (ML) INJECTION AS NEEDED
Status: DISCONTINUED | OUTPATIENT
Start: 2023-12-26 | End: 2023-12-26 | Stop reason: HOSPADM

## 2023-12-26 RX ORDER — LIDOCAINE HYDROCHLORIDE AND EPINEPHRINE 20; 10 MG/ML; UG/ML
INJECTION, SOLUTION INFILTRATION; PERINEURAL AS NEEDED
Status: DISCONTINUED | OUTPATIENT
Start: 2023-12-26 | End: 2023-12-26 | Stop reason: HOSPADM

## 2023-12-26 RX ORDER — HYDROCODONE BITARTRATE AND ACETAMINOPHEN 5; 325 MG/1; MG/1
1 TABLET ORAL EVERY 6 HOURS PRN
Qty: 10 TABLET | Refills: 0 | Status: SHIPPED | OUTPATIENT
Start: 2023-12-26 | End: 2023-12-26 | Stop reason: SDUPTHER

## 2023-12-26 RX ORDER — HYDROCODONE BITARTRATE AND ACETAMINOPHEN 5; 325 MG/1; MG/1
1 TABLET ORAL EVERY 6 HOURS PRN
Qty: 11 TABLET | Refills: 0 | Status: SHIPPED | OUTPATIENT
Start: 2023-12-26

## 2023-12-26 RX ORDER — FENTANYL CITRATE 50 UG/ML
INJECTION, SOLUTION INTRAMUSCULAR; INTRAVENOUS CONTINUOUS PRN
Status: DISCONTINUED | OUTPATIENT
Start: 2023-12-26 | End: 2023-12-26

## 2023-12-26 RX ORDER — SODIUM CHLORIDE, SODIUM LACTATE, POTASSIUM CHLORIDE, CALCIUM CHLORIDE 600; 310; 30; 20 MG/100ML; MG/100ML; MG/100ML; MG/100ML
50 INJECTION, SOLUTION INTRAVENOUS CONTINUOUS
Status: DISCONTINUED | OUTPATIENT
Start: 2023-12-26 | End: 2023-12-26 | Stop reason: HOSPADM

## 2023-12-26 RX ORDER — PROPOFOL 10 MG/ML
INJECTION, EMULSION INTRAVENOUS AS NEEDED
Status: DISCONTINUED | OUTPATIENT
Start: 2023-12-26 | End: 2023-12-26

## 2023-12-26 RX ORDER — LIDOCAINE HCL/PF 100 MG/5ML
SYRINGE (ML) INTRAVENOUS AS NEEDED
Status: DISCONTINUED | OUTPATIENT
Start: 2023-12-26 | End: 2023-12-26

## 2023-12-26 RX ORDER — CEFAZOLIN SODIUM 2 G/100ML
2 INJECTION, SOLUTION INTRAVENOUS ONCE
Status: COMPLETED | OUTPATIENT
Start: 2023-12-26 | End: 2023-12-26

## 2023-12-26 RX ADMIN — ONDANSETRON 4 MG: 2 INJECTION INTRAMUSCULAR; INTRAVENOUS at 13:08

## 2023-12-26 RX ADMIN — DEXAMETHASONE SODIUM PHOSPHATE 8 MG: 4 INJECTION, SOLUTION INTRAMUSCULAR; INTRAVENOUS at 13:08

## 2023-12-26 RX ADMIN — METOCLOPRAMIDE 10 MG: 5 INJECTION, SOLUTION INTRAMUSCULAR; INTRAVENOUS at 13:08

## 2023-12-26 RX ADMIN — SODIUM CHLORIDE, POTASSIUM CHLORIDE, SODIUM LACTATE AND CALCIUM CHLORIDE 50 ML/HR: 600; 310; 30; 20 INJECTION, SOLUTION INTRAVENOUS at 13:09

## 2023-12-26 RX ADMIN — PROPOFOL 50 MG: 10 INJECTION, EMULSION INTRAVENOUS at 13:55

## 2023-12-26 RX ADMIN — CEFAZOLIN SODIUM 2 G: 2 INJECTION, SOLUTION INTRAVENOUS at 13:50

## 2023-12-26 RX ADMIN — FAMOTIDINE 20 MG: 10 INJECTION, SOLUTION INTRAVENOUS at 13:08

## 2023-12-26 RX ADMIN — SODIUM CITRATE AND CITRIC ACID MONOHYDRATE 30 ML: 500; 334 SOLUTION ORAL at 13:08

## 2023-12-26 RX ADMIN — PROPOFOL 50 MG: 10 INJECTION, EMULSION INTRAVENOUS at 13:51

## 2023-12-26 RX ADMIN — SODIUM CHLORIDE, SODIUM LACTATE, POTASSIUM CHLORIDE, AND CALCIUM CHLORIDE: 600; 310; 30; 20 INJECTION, SOLUTION INTRAVENOUS at 13:50

## 2023-12-26 RX ADMIN — LIDOCAINE HYDROCHLORIDE 60 MG: 20 INJECTION INTRAVENOUS at 13:49

## 2023-12-26 RX ADMIN — PROPOFOL 100 MG: 10 INJECTION, EMULSION INTRAVENOUS at 13:49

## 2023-12-26 RX ADMIN — PROPOFOL 50 MG: 10 INJECTION, EMULSION INTRAVENOUS at 13:54

## 2023-12-26 RX ADMIN — PROPOFOL 50 MG: 10 INJECTION, EMULSION INTRAVENOUS at 13:53

## 2023-12-26 SDOH — HEALTH STABILITY: MENTAL HEALTH: CURRENT SMOKER: 1

## 2023-12-26 ASSESSMENT — PAIN SCALES - GENERAL
PAIN_LEVEL: 0
PAINLEVEL_OUTOF10: 0 - NO PAIN
PAINLEVEL_OUTOF10: 0 - NO PAIN

## 2023-12-26 ASSESSMENT — COLUMBIA-SUICIDE SEVERITY RATING SCALE - C-SSRS
1. IN THE PAST MONTH, HAVE YOU WISHED YOU WERE DEAD OR WISHED YOU COULD GO TO SLEEP AND NOT WAKE UP?: NO
6. HAVE YOU EVER DONE ANYTHING, STARTED TO DO ANYTHING, OR PREPARED TO DO ANYTHING TO END YOUR LIFE?: NO
2. HAVE YOU ACTUALLY HAD ANY THOUGHTS OF KILLING YOURSELF?: NO

## 2023-12-26 ASSESSMENT — PAIN - FUNCTIONAL ASSESSMENT
PAIN_FUNCTIONAL_ASSESSMENT: 0-10
PAIN_FUNCTIONAL_ASSESSMENT: 0-10

## 2023-12-26 NOTE — ANESTHESIA PREPROCEDURE EVALUATION
Patient: Mirtha Kumar    Procedure Information       Date/Time: 12/26/23 1410    Procedure: Left Endoscopic Carpal Tunnel Release (LOCAL/MAC) (Left: Wrist) - LOCAL/MAC, 2 GRAMS ANCEF, NO SPECIAL EQUIPMENT    Location: [facility] OR 07 / Virtual [facility] OR    Surgeons: Alessio Harley, DO            Relevant Problems   Anesthesia   (+) PONV (postoperative nausea and vomiting)      Cardiovascular   (+) Aortic valve regurgitation, nonrheumatic      Neuro/Psych   (+) Carpal tunnel syndrome   (+) Carpal tunnel syndrome on left      Musculoskeletal   (+) Carpal tunnel syndrome   (+) Carpal tunnel syndrome on left   (+) Fibromyalgia       Clinical information reviewed:   Tobacco  Allergies  Meds  Problems  Med Hx  Surg Hx  OB Status    Fam Hx  Soc Hx        NPO Detail:  NPO/Void Status  Date of Last Liquid: 12/25/23  Time of Last Liquid: 2345  Date of Last Solid: 12/25/23  Time of Last Solid: 2345  Last Intake Type: Clear fluids; Light meal         Physical Exam    Airway  Mallampati: II  TM distance: >3 FB     Cardiovascular   Rhythm: regular  Rate: normal     Dental    Pulmonary - normal exam     Abdominal - normal exam             Anesthesia Plan    ASA 3     MAC     The patient is a current smoker.  Patient was not previously instructed to abstain from smoking on day of procedure.  Patient did not smoke on day of procedure.    intravenous induction   Anesthetic plan and risks discussed with patient.  Use of blood products discussed with patient who consented to blood products.    Plan discussed with CRNA.

## 2023-12-26 NOTE — DISCHARGE INSTRUCTIONS
[facility]  [phone]   Fax: [phone] [address] [facility] [address]   [address] [facility] -  [address] [place] [id number]    Upper Extremity - Endoscopic Carpal Tunnel Release    1. Dressing    You have a soft bandage over the operative site.  DO NOT GET DRESSING WET! Once at home, if your dressing feels too tight or mistakenly gets wet, please contact the office. This bandage can be removed in 48 hours and replaced in with Band-Aids as needed. After 48 hours you may wash your hands and shower, but no submerging.    If your sutures are visible (black strings), they will be removed about 10-14 days after surgery.  You should make an appointment to see your physician 10-14 after surgery.    2. Activity     Most people underestimate the length of time required to fully recover from surgery.  It is recommended you take some pain medication the evening following your surgery so when the local anesthetic wears off (in the middle of the night), you are not in severe pain.   With pain medication, start at the lowest dose that controls your pain.       After the first 1-3 days, we encourage you to balance your activity between ambulation, sitting in a chair, and resting in bed with your arm elevated. Let swelling and pain be your guide to activity, you should avoid prolonged (over 20 minutes) standing or sitting. In general, limit your activities to home for the first 1-3 days.    3. Pain    You will be discharged to home with a prescription for oral pain medication. A most important factor in pain control is rest and elevation. Ice may also be applied to the operative site for 30 minute intervals. Please use a waterproof bag for ice or cold packs.  Again, as you feel the numbness resolving and some onset of discomfort, please take pain medication, don't wait till full resolution of block.   Try to use the lowest dose of pain medication that controls your pain.       The pain medication may cause constipation. Drink plenty of fluids, eat fruits and vegetables. You may use an over the counter laxative or stool softener if necessary. Take pain medication with some food in your stomach, as prescribed by your pharmacist.     4. Elevation     Elevation of the operative extremity is critical. Elevation reduces swelling and minimizes pain. Less swelling is associated with a lower infectious rate, fewer wound complications, less post-operative stiffness, and more rapid recovery of function. To keep the swelling down, your hand must be kept above the level of your heart.

## 2023-12-26 NOTE — ANESTHESIA POSTPROCEDURE EVALUATION
Patient: Mirtha Kumar    Procedure Summary       Date: 12/26/23 Room / Location: [facility] 07 / Virtual [facility]    Anesthesia Start: 1347 Anesthesia Stop: 1407    Procedure: Left Endoscopic Carpal Tunnel Release (LOCAL/MAC) (Left: Wrist) Diagnosis:       Carpal tunnel syndrome on left      (Carpal tunnel syndrome on left [G56.02])    Surgeons: Alessio Harley DO Responsible Provider: EILEEN eWbb    Anesthesia Type: MAC ASA Status: 3            Anesthesia Type: MAC    Vitals Value Taken Time   /75 12/26/23 1421   Temp 36.7 °C (98.1 °F) 12/26/23 1406   Pulse 74 12/26/23 1423   Resp 18 12/26/23 1423   SpO2 95 % 12/26/23 1423   Vitals shown include unvalidated device data.    Anesthesia Post Evaluation    Patient location during evaluation: PACU  Patient participation: complete - patient participated  Level of consciousness: awake and alert  Pain score: 0  Pain management: adequate  Airway patency: patent  Cardiovascular status: acceptable  Respiratory status: acceptable  Hydration status: acceptable  Postoperative Nausea and Vomiting: none    There were no known notable events for this encounter.

## 2023-12-26 NOTE — OP NOTE
ORTHOPEDIC OPERATIVE NOTE    Name: Mirtha Kumar  : 1975  Surgeon: Rogelio Harley DO  Facility: [facility]  Date of Surgery: 23  ORTHOPEDIC OPERATIVE NOTE    SURGEON:     Rogelio Harley DO  PRE OP DIAGNOSIS:  Carpal Tunnel Syndrome left Wrist  POST OP DIAGNOSIS:  Same  PROCEDURE:   Endoscopic Carpal Tunnel Release left Wrist    ANESTHESIA:  Local with sedation  ASSISTANT:       COMPLICATIONS:   None.  CONDITION:    Satisfactory to PACU.  BLOOD LOSS: Minimal    INDICATION FOR PROCEDURE: The patient is a 48 y.o. -year-old female who has failed nonsurgical management for left carpal tunnel syndrome including night splints. They had an extensive workup consisting of signs, symptoms, and clinical history of EMG nerve conduction study consistent with left carpal syndrome.  The patient was seen in the office, fully informed risks and benefits of the procedure, and elected to undergo the procedure.    PROCEDURE IN DETAIL: The patient was seen and consented preoperatively with side and site of surgery appropriately marked. The patient was taken back to the operative suite, placed supine on the operative table, and placed on monitor for the duration of case. The patient was administered sedation and monitored throughout the entire surgery by Department of Anesthesia. While sedated, the patient was administered 5 cc of mixed marcaine and lidocaine to the volar aspect of wrist and palm for local anesthesia. The operative upper extremity was then sterilely prepped and draped in sterile orthopedic fashion, elevated with an Esmarch, and tourniquet inflated to 250 mmHg for the duration of case, and time-out was performed confirming site of surgery and surgery to be performed.    A 1-cm transverse incision was made to the ulnar aspect of the palmaris longus at proximal wrist crease. Skin and underlying tissues were sharply dissected down. Hemostasis maintained with bipolar electrocauterization. Distally based  flap of the fascia overlying the median nerve was then released, and under direct visualization, proximal fascia was released with Littler scissors. The elevator and dilator were then placed in the carpal tunnel with appropriate ease of passage and corrugated feel of the undersurface of transcarpal ligament. The endoscope was then placed under direct visualization. The transverse carpal ligament was released in its entirety, confirmed both visually as well as by utilizing endoscope as a probe, which freely passed following release. The nerve was evaluated. No evidence of lesion or adhesion was present.    The wound was irrigated with sterile saline, closed with 5-0 nylon suture. Sterile dressing was then placed of Xeroform and 4x4s affixed with Kerlix and Ace wrap. Tourniquet was deflated, and the patient was taken to PACU in satisfactory condition.    Electronically signed  Rogelio Harley DO

## 2024-01-08 ENCOUNTER — OFFICE VISIT (OUTPATIENT)
Dept: ORTHOPEDIC SURGERY | Facility: CLINIC | Age: 49
End: 2024-01-08
Payer: COMMERCIAL

## 2024-01-08 VITALS — BODY MASS INDEX: 23.54 KG/M2 | HEIGHT: 67 IN | WEIGHT: 150 LBS

## 2024-01-08 DIAGNOSIS — G56.02 CARPAL TUNNEL SYNDROME ON LEFT: Primary | ICD-10-CM

## 2024-01-08 PROCEDURE — 99024 POSTOP FOLLOW-UP VISIT: CPT | Performed by: ORTHOPAEDIC SURGERY

## 2024-01-08 NOTE — PROGRESS NOTES
48 y.o. female presents today for for follow up after left ECTR. The patient reports doing well, no issues. The DOS was 2 weeks ago. Pain is controlled. Patient denies numbness and tingling.      Review of Systems    Constitutional: see HPI, no fever, no chills, not feeling tired, no significant weight gain or weight loss.   Eyes: No vision changes  ENT: no nosebleeds.   Cardiovascular: no chest pain.   Respiratory: no shortness of breath and no cough.   Gastrointestinal: no abdominal pain, no nausea, no vomiting and no diarrhea.   Musculoskeletal: per HPI  Neurological: no headache, no gait disturbances  Psychiatric: no depression and no sleep disturbances.   Endocrine: no muscle weakness and no muscle cramps.   Hematologic/Lymphatic: no swollen glands and no tendency for easy bruising or excessive swelling.     Patient's past medical history, past surgical history, allergies, and medications have been reviewed unless otherwise noted in the chart.     Hand/Wrist Post-Op Exam  Inspection:  no evidence of infection, no erythema, Palpation:  compartments are soft, Range of Motion:  F/E 80/80, S/P 90/90 Finger ROM Full extension, full flexion Stability:  stable Strength:  5/5 F/E, 5/5 Adduction/Abduction 5/5 Opposition Skin:  incision site clean, dry and intact, healing without complication, Vascular:  capillary refill <2 seconds distally, Sensation:  intact to light touch distally.    Constitutional   General appearance: Alert and in no acute distress. Well developed, well nourished.    Eyes   External Eye, Conjunctiva and lids: Normal external exam - pupils were equal in size, round, reactive to light (PERRL) with normal accommodation and extraocular movements intact (EOMI).   Ears, Nose, Mouth, and Throat   Hearing: Normal.   Neck   Neck: No neck mass was observed. Supple.   Pulmonary   Respiratory effort: No respiratory distress.   Cardiovascular   Examination of extremities: No peripheral edema.   Neurologic    Sensation: Normal.   Psychiatric   Judgment and insight: Intact.   Orientation to person, place, and time: Alert and oriented x 3.       Mood and affect: Normal.      2 weeks s/p left ECTR  I discussed the diagnosis and treatment options with the patient today along with their associated risks and benefits. After thorough discussion, the patient has elected to proceed with conservative management. All questions were answered to the patients satisfaction who seems satisfied with the plan.      Sutures removed  Steris  Vitamin E cream prn to scar tissue  Activities as tolerated  FU 4-6 weeks prn - no XR

## 2024-07-10 ENCOUNTER — HOSPITAL ENCOUNTER (OUTPATIENT)
Dept: RADIOLOGY | Facility: CLINIC | Age: 49
Discharge: HOME | End: 2024-07-10
Payer: COMMERCIAL

## 2024-07-10 ENCOUNTER — APPOINTMENT (OUTPATIENT)
Dept: ORTHOPEDIC SURGERY | Facility: CLINIC | Age: 49
End: 2024-07-10
Payer: COMMERCIAL

## 2024-07-10 VITALS — BODY MASS INDEX: 23.54 KG/M2 | WEIGHT: 150 LBS | HEIGHT: 67 IN

## 2024-07-10 DIAGNOSIS — G56.02 CARPAL TUNNEL SYNDROME ON LEFT: ICD-10-CM

## 2024-07-10 DIAGNOSIS — M19.032 CMC DJD(CARPOMETACARPAL DEGENERATIVE JOINT DISEASE), LOCALIZED PRIMARY, LEFT: Primary | ICD-10-CM

## 2024-07-10 PROCEDURE — 20600 DRAIN/INJ JOINT/BURSA W/O US: CPT | Performed by: ORTHOPAEDIC SURGERY

## 2024-07-10 PROCEDURE — 99214 OFFICE O/P EST MOD 30 MIN: CPT | Performed by: ORTHOPAEDIC SURGERY

## 2024-07-10 PROCEDURE — 73130 X-RAY EXAM OF HAND: CPT | Mod: LT

## 2024-07-10 PROCEDURE — 73130 X-RAY EXAM OF HAND: CPT | Mod: LEFT SIDE | Performed by: STUDENT IN AN ORGANIZED HEALTH CARE EDUCATION/TRAINING PROGRAM

## 2024-07-10 RX ORDER — TRIAMCINOLONE ACETONIDE 40 MG/ML
40 INJECTION, SUSPENSION INTRA-ARTICULAR; INTRAMUSCULAR
Status: COMPLETED | OUTPATIENT
Start: 2024-07-10 | End: 2024-07-10

## 2024-07-10 RX ORDER — LIDOCAINE HYDROCHLORIDE 10 MG/ML
1 INJECTION INFILTRATION; PERINEURAL
Status: COMPLETED | OUTPATIENT
Start: 2024-07-10 | End: 2024-07-10

## 2024-07-10 ASSESSMENT — PAIN - FUNCTIONAL ASSESSMENT: PAIN_FUNCTIONAL_ASSESSMENT: 0-10

## 2024-07-10 ASSESSMENT — PAIN DESCRIPTION - DESCRIPTORS: DESCRIPTORS: ACHING

## 2024-07-10 ASSESSMENT — PAIN SCALES - GENERAL: PAINLEVEL_OUTOF10: 3

## 2024-07-10 NOTE — PROGRESS NOTES
48 y.o. female presents today for for follow up after left ECTR. The patient reports doing well, no issues. The DOS was 26 weeks ago. Pain is controlled. Patient denies numbness and tingling.      Review of Systems    Constitutional: see HPI, no fever, no chills, not feeling tired, no significant weight gain or weight loss.   Eyes: No vision changes  ENT: no nosebleeds.   Cardiovascular: no chest pain.   Respiratory: no shortness of breath and no cough.   Gastrointestinal: no abdominal pain, no nausea, no vomiting and no diarrhea.   Musculoskeletal: per HPI  Neurological: no headache, no gait disturbances  Psychiatric: no depression and no sleep disturbances.   Endocrine: no muscle weakness and no muscle cramps.   Hematologic/Lymphatic: no swollen glands and no tendency for easy bruising or excessive swelling.     Patient's past medical history, past surgical history, allergies, and medications have been reviewed unless otherwise noted in the chart.     Hand/Wrist Post-Op Exam  Inspection:  no evidence of infection, no erythema, Palpation:  compartments are soft, Range of Motion:  F/E 80/80, S/P 90/90 Finger ROM Full extension, full flexion Stability:  stable Strength:  5/5 F/E, 5/5 Adduction/Abduction 5/5 Opposition Skin:  incision site clean, dry and intact, healing without complication, Vascular:  capillary refill <2 seconds distally, Sensation:  intact to light touch distally.    Constitutional   General appearance: Alert and in no acute distress. Well developed, well nourished.    Eyes   External Eye, Conjunctiva and lids: Normal external exam - pupils were equal in size, round, reactive to light (PERRL) with normal accommodation and extraocular movements intact (EOMI).   Ears, Nose, Mouth, and Throat   Hearing: Normal.   Neck   Neck: No neck mass was observed. Supple.   Pulmonary   Respiratory effort: No respiratory distress.   Cardiovascular   Examination of extremities: No peripheral edema.   Neurologic    Sensation: Normal.   Psychiatric   Judgment and insight: Intact.   Orientation to person, place, and time: Alert and oriented x 3.       Mood and affect: Normal.      26 weeks s/p left ECTR  I discussed the diagnosis and treatment options with the patient today along with their associated risks and benefits. After thorough discussion, the patient has elected to proceed with conservative management. All questions were answered to the patients satisfaction who seems satisfied with the plan.      Sutures removed  Steris  Vitamin E cream prn to scar tissue  Activities as tolerated  FU 4-6 weeks prn - no XR

## 2024-07-10 NOTE — PROGRESS NOTES
48 y.o. female presents today for evaluation of left wrist masses and base of thumb pain. The patient reports gradual onset of worsening pain.  Noticed the masses a few months ago.  We did do a prior endoscopic carpal tunnel release about 8 months ago, states no longer wakes her up at nighttime, no longer has numbness or tingling. Pain is controlled. Patient reports no numbness and tingling.  Does report having some falls..  Reports pain worse with use, better at rest.  Worse opening bottles and jars.  Pain dull ache, sharp at times.     Review of Systems    Constitutional: see HPI, no fever, no chills, not feeling tired, no significant weight gain or weight loss.   Eyes: No vision changes  ENT: no nosebleeds.   Cardiovascular: no chest pain.   Respiratory: no shortness of breath and no cough.   Gastrointestinal: no abdominal pain, no nausea, no vomiting and no diarrhea.   Musculoskeletal: per HPI  Neurological: no headache, no gait disturbances  Psychiatric: no depression and no sleep disturbances.   Endocrine: no muscle weakness and no muscle cramps.   Hematologic/Lymphatic: no swollen glands and no tendency for easy bruising or excessive swelling.     Patient's past medical history, past surgical history, allergies, and medications have been reviewed unless otherwise noted in the chart.     CMC Arthritis Exam  Inspection:  no evidence of infection, no edema, no erythema, no ecchymosis, Palpation:  compartments are soft, pain with palpation over the Thumb CMC joint, Range of Motion:  full wrist and finger range of motion, Stability:  no wrist or finger instability detected, Strength:  5/5  and pinch strength, Skin:  intact, Vascular:  capillary refill <2 seconds distally, Sensation:  intact to light touch distally, Tests:  negative Finkelstein's , positive Thumb CMC Grind.       Mass  Location: Left volar wrist specific: Angling cysts inspection (mass size): 5 mm x 5 mm and 1 cm x 1 cm palpation:  soft,  mobile, compressible, Range of Motion:  full wrist and finger motion, Stability:  no instability noted, Strength:  5/5 hand and wrist, Skin:  intact, Vascular:  capillary refill <2 seconds distally, Sensation:  sensation intact to light touch distally.       Constitutional   General appearance: Alert and in no acute distress. Well developed, well nourished.    Eyes   External Eye, Conjunctiva and lids: Normal external exam - pupils were equal in size, round, reactive to light (PERRL) with normal accommodation and extraocular movements intact (EOMI).   Ears, Nose, Mouth, and Throat   Hearing: Normal.   Neck   Neck: No neck mass was observed. Supple.   Pulmonary   Respiratory effort: No respiratory distress.   Cardiovascular   Examination of extremities: No peripheral edema.   Psychiatric   Judgment and insight: Intact.   Orientation to person, place, and time: Alert and oriented x 3.       Mood and affect: Normal.      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  I had a long discussion with the patient regarding their presentation and the treatment options.  We discussed initial nonoperative versus operative management options as well as potential further diagnostic imaging.  We again discussed her treatment options going forward along with their associated risks and benefits. After thorough discussion, the patient has elected to proceed with conservative management. All questions were answered to the patients satisfaction who seems satisfied with the plan.  They will call the office with any questions/concerns.    Discussion I discussed the diagnosis and treatment options with the patient today along with their associated risks and benefits. After thorough discussion, the patient has elected to proceed with a corticosteroid injection with Kenalog and Xylocaine, which was performed in  the office today under aseptic technique and the patient tolerated the procedure well.          Ortho Injections:           Injection site left thumb CMC. Medication 1 cc 1% Xylocaine, 1 cc 40 mg Kenalog, Injection given under sterile conditions. No immediate complications noted. Post injection instructions given.  Comfort Cool as needed  Voltaren gel as needed  X-ray of the hand on the way out  Follow-up 4 months as needed no x-ray    Patient ID: Mirtha Kumar is a 48 y.o. female.    S Inj/Asp: L thumb CMC on 7/10/2024 8:44 AM  Indications: pain  Details: 25 G needle (dorsoradial) approach  Medications: 40 mg triamcinolone acetonide 40 mg/mL; 1 mL lidocaine 10 mg/mL (1 %)  Outcome: tolerated well, no immediate complications  Procedure, treatment alternatives, risks and benefits explained, specific risks discussed. Consent was given by the patient. Immediately prior to procedure a time out was called to verify the correct patient, procedure, equipment, support staff and site/side marked as required. Patient was prepped and draped in the usual sterile fashion.

## 2024-07-10 NOTE — PROGRESS NOTES
Pt is coming in with left wrist pain. She had a L ECTR done 12/26/2023. Pt has lumps near the incision site and has stabbing pain from the wrist to the middle forearm that carly progressively worse as the day goes on. Pt states she falls often and does not remember if she did injury her wrist after the surgery.

## (undated) DEVICE — CUFF, TOURNIQUET, 18 X 4, DUAL PORT/SNGL BLADDER, DISP, LF

## (undated) DEVICE — SOLUTION, IRRIGATION, SODIUM CHLORIDE 0.9%, 1000 ML, POUR BOTTLE

## (undated) DEVICE — PADDING, CAST, SOFT, 2 X 4YDS

## (undated) DEVICE — TOWEL PACK, STERILE, 4/PACK, BLUE

## (undated) DEVICE — SUTURE, ETHILON, 4-0, BLK, MONO, PS-2 18

## (undated) DEVICE — SYRINGE, 10 CC, LUER LOCK

## (undated) DEVICE — GLOVE, PROTEXIS PI CLASSIC, SZ-8.0, PF, PF, LF

## (undated) DEVICE — BLADE, SMARTRELEASE, ONYX, CARPAL TUNNEL, DISP

## (undated) DEVICE — BANDAGE, ELASTIC, PREMIUM, SELF-CLOSE, 2 IN X 5 YD, STERILE

## (undated) DEVICE — COVER HANDLE LIGHT, STERIS, BLUE, STERILE

## (undated) DEVICE — APPLICATOR, CHLORAPREP, W/ORANGE TINT, 26ML

## (undated) DEVICE — DRESSING, GAUZE, PETROLATUM, STRIP, XEROFORM, 1 X 8 IN, STERILE